# Patient Record
Sex: FEMALE | Race: WHITE | NOT HISPANIC OR LATINO | Employment: FULL TIME | URBAN - NONMETROPOLITAN AREA
[De-identification: names, ages, dates, MRNs, and addresses within clinical notes are randomized per-mention and may not be internally consistent; named-entity substitution may affect disease eponyms.]

---

## 2020-07-22 ENCOUNTER — HISTORICAL (OUTPATIENT)
Dept: ADMINISTRATIVE | Facility: HOSPITAL | Age: 35
End: 2020-07-22

## 2020-07-22 LAB
COVID-19 INTERNAL CONTROL: NORMAL
SARS-COV-2 RNA RESP QL NAA+PROBE: NOT DETECTED

## 2020-11-17 ENCOUNTER — CLINICAL SUPPORT (OUTPATIENT)
Dept: OTHER | Facility: CLINIC | Age: 35
End: 2020-11-17

## 2020-11-17 DIAGNOSIS — Z00.8 ENCOUNTER FOR OTHER GENERAL EXAMINATION: ICD-10-CM

## 2020-11-18 VITALS — HEIGHT: 64 IN

## 2020-11-18 LAB
GLUCOSE SERPL-MCNC: 87 MG/DL (ref 60–140)
HDLC SERPL-MCNC: 82 MG/DL
POC CHOLESTEROL, LDL (DOCK): 83 MG/DL
POC CHOLESTEROL, TOTAL: 182 MG/DL
TRIGL SERPL-MCNC: 83 MG/DL

## 2020-12-04 DIAGNOSIS — Z01.84 ANTIBODY RESPONSE EXAMINATION: ICD-10-CM

## 2021-01-13 ENCOUNTER — IMMUNIZATION (OUTPATIENT)
Dept: OBSTETRICS AND GYNECOLOGY | Facility: CLINIC | Age: 36
End: 2021-01-13

## 2021-01-13 DIAGNOSIS — Z23 NEED FOR VACCINATION: ICD-10-CM

## 2021-01-13 PROCEDURE — 0001A COVID-19, MRNA, LNP-S, PF, 30 MCG/0.3 ML DOSE VACCINE: ICD-10-PCS | Mod: CV19,,, | Performed by: ANESTHESIOLOGY

## 2021-01-13 PROCEDURE — 91300 COVID-19, MRNA, LNP-S, PF, 30 MCG/0.3 ML DOSE VACCINE: ICD-10-PCS | Mod: ,,, | Performed by: ANESTHESIOLOGY

## 2021-01-13 PROCEDURE — 0001A COVID-19, MRNA, LNP-S, PF, 30 MCG/0.3 ML DOSE VACCINE: CPT | Mod: CV19,,, | Performed by: ANESTHESIOLOGY

## 2021-01-13 PROCEDURE — 91300 COVID-19, MRNA, LNP-S, PF, 30 MCG/0.3 ML DOSE VACCINE: CPT | Mod: ,,, | Performed by: ANESTHESIOLOGY

## 2021-02-03 ENCOUNTER — IMMUNIZATION (OUTPATIENT)
Dept: OBSTETRICS AND GYNECOLOGY | Facility: CLINIC | Age: 36
End: 2021-02-03

## 2021-02-03 DIAGNOSIS — Z23 NEED FOR VACCINATION: Primary | ICD-10-CM

## 2021-02-03 PROCEDURE — 91300 COVID-19, MRNA, LNP-S, PF, 30 MCG/0.3 ML DOSE VACCINE: CPT | Mod: ,,, | Performed by: ANESTHESIOLOGY

## 2021-02-03 PROCEDURE — 0002A COVID-19, MRNA, LNP-S, PF, 30 MCG/0.3 ML DOSE VACCINE: CPT | Mod: CV19,,, | Performed by: ANESTHESIOLOGY

## 2021-02-03 PROCEDURE — 0002A COVID-19, MRNA, LNP-S, PF, 30 MCG/0.3 ML DOSE VACCINE: ICD-10-PCS | Mod: CV19,,, | Performed by: ANESTHESIOLOGY

## 2021-02-03 PROCEDURE — 91300 COVID-19, MRNA, LNP-S, PF, 30 MCG/0.3 ML DOSE VACCINE: ICD-10-PCS | Mod: ,,, | Performed by: ANESTHESIOLOGY

## 2021-05-04 PROBLEM — K11.20 SALIVARY GLAND INFECTION: Status: ACTIVE | Noted: 2021-05-04

## 2021-06-01 ENCOUNTER — CLINICAL SUPPORT (OUTPATIENT)
Dept: OTHER | Facility: CLINIC | Age: 36
End: 2021-06-01

## 2021-06-01 DIAGNOSIS — Z00.8 ENCOUNTER FOR OTHER GENERAL EXAMINATION: ICD-10-CM

## 2021-07-06 VITALS — HEIGHT: 64 IN | BODY MASS INDEX: 23.34 KG/M2

## 2021-07-06 LAB
GLUCOSE SERPL-MCNC: 77 MG/DL (ref 60–140)
HDLC SERPL-MCNC: 75 MG/DL
POC CHOLESTEROL, LDL (DOCK): 64 MG/DL
POC CHOLESTEROL, TOTAL: 148 MG/DL
TRIGL SERPL-MCNC: 47 MG/DL

## 2022-01-10 ENCOUNTER — PATIENT MESSAGE (OUTPATIENT)
Dept: ADMINISTRATIVE | Facility: OTHER | Age: 37
End: 2022-01-10

## 2022-01-10 ENCOUNTER — APPOINTMENT (OUTPATIENT)
Dept: PRIMARY CARE CLINIC | Facility: CLINIC | Age: 37
End: 2022-01-10

## 2022-01-10 DIAGNOSIS — Z11.52 ENCOUNTER FOR SCREENING LABORATORY TESTING FOR COVID-19 VIRUS: Primary | ICD-10-CM

## 2022-01-10 LAB
CTP QC/QA: YES
SARS-COV-2 AG RESP QL IA.RAPID: NEGATIVE

## 2022-01-10 PROCEDURE — 87811 SARS-COV-2 COVID19 W/OPTIC: CPT | Mod: PBBFAC,PN

## 2022-05-03 PROBLEM — Z00.00 WELLNESS EXAMINATION: Status: ACTIVE | Noted: 2022-05-03

## 2022-05-03 PROBLEM — R19.8 ALTERNATING CONSTIPATION AND DIARRHEA: Status: ACTIVE | Noted: 2022-05-03

## 2022-05-03 PROBLEM — R10.9 ABDOMINAL PAIN: Status: ACTIVE | Noted: 2022-05-03

## 2022-05-03 PROBLEM — Z80.0 FAMILY HISTORY OF COLON CANCER: Status: ACTIVE | Noted: 2022-05-03

## 2022-06-30 PROBLEM — D64.9 ANEMIA: Status: ACTIVE | Noted: 2022-06-30

## 2022-08-08 PROBLEM — Z00.00 WELLNESS EXAMINATION: Status: RESOLVED | Noted: 2022-05-03 | Resolved: 2022-08-08

## 2022-09-15 ENCOUNTER — IMMUNIZATION (OUTPATIENT)
Dept: PRIMARY CARE CLINIC | Facility: CLINIC | Age: 37
End: 2022-09-15

## 2022-09-15 DIAGNOSIS — Z23 NEED FOR VACCINATION: Primary | ICD-10-CM

## 2022-09-15 PROCEDURE — 0124A COVID-19, MRNA, LNP-S, BIVALENT BOOSTER, PF, 30 MCG/0.3 ML DOSE: CPT | Mod: PBBFAC,PN

## 2022-09-15 PROCEDURE — 91312 COVID-19, MRNA, LNP-S, BIVALENT BOOSTER, PF, 30 MCG/0.3 ML DOSE: CPT | Mod: PBBFAC,PN

## 2023-01-19 ENCOUNTER — CLINICAL SUPPORT (OUTPATIENT)
Dept: INTERNAL MEDICINE | Facility: CLINIC | Age: 38
End: 2023-01-19
Payer: COMMERCIAL

## 2023-01-19 DIAGNOSIS — Z00.00 ROUTINE GENERAL MEDICAL EXAMINATION AT A HEALTH CARE FACILITY: ICD-10-CM

## 2023-01-19 PROCEDURE — 97802 PR MED NUTR THER, 1ST, INDIV, EA 15 MIN: ICD-10-PCS | Mod: 95,,,

## 2023-01-19 PROCEDURE — 97802 MEDICAL NUTRITION INDIV IN: CPT | Mod: 95,,,

## 2023-01-19 NOTE — PROGRESS NOTES
The patient location is: [place], [place]  The chief complaint leading to consultation is: nutrition counseling    Visit type: audiovisual    Face to Face time with patient: 60  100 minutes of total time spent on the encounter, which includes face to face time and non-face to face time preparing to see the patient (eg, review of tests), Obtaining and/or reviewing separately obtained history, Documenting clinical information in the electronic or other health record, Independently interpreting results (not separately reported) and communicating results to the patient/family/caregiver, or Care coordination (not separately reported).         Each patient to whom he or she provides medical services by telemedicine is:  (1) informed of the relationship between the physician and patient and the respective role of any other health care provider with respect to management of the patient; and (2) notified that he or she may decline to receive medical services by telemedicine and may withdraw from such care at any time.    Notes:

## 2023-01-19 NOTE — PROGRESS NOTES
"Nutrition Assessment  Session Time:  60 minutes      Client name:  Billie Merlos  :  1985  Age:  37 y.o.  Gender:  female    Client states:  A very pleasant Ms. Vogel is here for a nutrition counseling session. 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 She is  with 5 children 16 and under who are involved in multiple activities. 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 Her  is an avid mary and they often have venison and fish. She is concerned that during the COVID pandemic, a very stressful time at work when she worked long hours from home and had to refrain from the many social and physical activities in her life, she gained 25 pounds, much of it due to stress eating and inactivity. She would like to "get back on track" but is finding it difficult to break the bad habits she started during COVID, particularly constant snacking of calorie dense foods. She has been skipping breakfast in an effort to cut down on calories, but finds herself making multiple trips to her pantry to snack later in the afternoons. We discussed having a bigger, balanced breakfast to avoid cravings later in the day as well as avoiding the kitchen when she comes home from work (her  cooks dinners) and focusing on only purchasing healthier snack options that are packaged or portioned for 1 portion only, such as snack packs of almonds and blueberries for after work and small packaged squares of dark chocolate for dessert after dinner. Ms. Vogel was very active prior to the pandemic. She belongs to a gym and enjoys the Body Pump and Body Combat classes there, and she would like to start attending them again on a regular basis. We also discussed " "integrating some 20+ minute walks 2-3 days per week to increase her activity. Educated patient on discerning eating from stress vs eating from hunger and finding alternate activities after work to alleviate stress (eg walking the dog, calling a friend).    Anthropometrics  Height:  64"     Weight:  150 lbs  BMI:  25.7  % Body Fat:  N/A    Clinical Signs/Symptoms  N/V/D:  alternating constipation and diarrhea (manageable, per pt) due to IBS  Appetite:  good       No past medical history on file.    Past Surgical History:   Procedure Laterality Date    WISDOM TOOTH EXTRACTION         Medications    has a current medication list which includes the following prescription(s): valacyclovir.    Vitamins, Minerals, and/or Supplements:  fish oil, vitamin C & E intermittently     Food/Medication Interactions:  Reviewed     Food Allergies or Intolerances:  none     Social History    Marital status:    Employment:  [employer] pharmacist    Social History     Tobacco Use    Smoking status: Never    Smokeless tobacco: Never   Substance Use Topics    Alcohol use: Yes     Comment: socially         Lab Reports   Sodium   Date Value Ref Range Status   06/15/2022 140 134 - 144 mmol/L Final     Potassium   Date Value Ref Range Status   06/15/2022 3.9 3.5 - 5.2 mmol/L Final     Chloride   Date Value Ref Range Status   06/15/2022 105 96 - 106 mmol/L Final     CO2   Date Value Ref Range Status   06/15/2022 24 20 - 29 mmol/L Final     Glucose   Date Value Ref Range Status   06/15/2022 90 65 - 99 mg/dL Final     BUN   Date Value Ref Range Status   06/15/2022 11 6 - 20 mg/dL Final     Creatinine   Date Value Ref Range Status   06/15/2022 0.73 0.57 - 1.00 mg/dL Final     Calcium   Date Value Ref Range Status   06/15/2022 8.5 (L) 8.7 - 10.2 mg/dL Final     Albumin   Date Value Ref Range Status   06/15/2022 4.2 3.8 - 4.8 g/dL Final     Total Bilirubin   Date Value Ref Range Status   06/15/2022 0.2 0.0 - 1.2 mg/dL Final     AST   Date Value " Ref Range Status   06/15/2022 15 0 - 40 IU/L Final     ALT   Date Value Ref Range Status   06/15/2022 7 0 - 32 IU/L Final     eGFR if non    Date Value Ref Range Status   06/04/2021 104 >59 mL/min/1.73 Final      Lab Results   Component Value Date    WBC 3.8 06/15/2022    HGB 10.8 (L) 06/15/2022    HCT 32.5 (L) 06/15/2022    MCV 94 06/15/2022     (L) 06/15/2022        Lab Results   Component Value Date    CHOL 122 06/15/2022     Lab Results   Component Value Date    HDL 49 06/15/2022     Lab Results   Component Value Date    LDLCALC 53 06/15/2022     Lab Results   Component Value Date    TRIG 109 06/15/2022     No results found for: CHOLHDL  Lab Results   Component Value Date    HGBA1C 5.2 06/15/2022     BP Readings from Last 1 Encounters:   06/30/22 100/70       Food History  Breakfast:  1 cup coffee with milk, honey and cinnamon; usually skips breakfast but had frittata of egg whites (2) and spinach today and liked it  Mid-morning Snack:  none  Lunch:  usually soup (tomato basil, vegetable, broccoli & cheddar) and whatever protein is offered at the cafeteria that day with a side of vegetables  Mid-afternoon Snack:  multiple trips to pantry for: chocolate, ritz crackers with jalapeno artichoke dip, frozen blueberries, popcorn  Dinner:  cut up potatoes baked with olive oil and Walter's seasoning and another vegetable with some type protein (venison & pork, chicken, fish), sometimes a salad  Snack:  a few squares of chocolate  *Fluid intake:  water, coffee  Alcohol: none during the week; on weekends: 2 drinks on Friday and Saturday nights    Exercise History:  no formal exercise currently; was attending classes at her gym 2-3x per week last fall but stopped for Christmas holiday and would like to restart    Cultural/Spiritual/Personal Preferences:  None identified    Support System:  spouse and children    State of Change:  Preparation    Barriers to Change:  habit energy; kids have very busy  schedule    Diagnosis    Involuntary weight gain related to poor food choices and inactivity as evidenced by lack of exercise/activity during pandemic and high calorie/processed food choices reported by patient.    Intervention    RMR (Method:  Jessica Ortega Erik):  1354 kcal  Activity Factor:  1.3    BHAVIK:  1760 kcal    Goals:  1.  Eat a well-balanced breakfast of 300-450 kcal daily (options discussed).  2.  Focus on purchasing or preparing healthy snack options that are already single portioned (options discussed).  3.  Learn to recognize stress eating and aim to alleviate stress through alternate enjoyable activities.  4. Make it a rule to avoid the kitchen/pantry when arriving home from work until it is no longer a trigger for eating mindlessly.  5. Walk the dog with the kids at least 2 days per week for 20+ minutes.  6. Attend cardio classes at the gym 2-3 days per week.    Nutrition Education  The following education was provided to the patient:  Complimented patient on proactive role in health maintenance.  Complimented patient on physical activity efforts.  Discussed meal planning/[employer] My Plate design.  Discussed healthy snacking.   Discussed weight management.  Discussed Heart Healthy Eating.  Discussed Iron-Rich Nutrition Therapy.  Suggested dietary modifications based on current dietary behaviors and individual food preferences.  Discussed macronutrient distribution among meals and snacks, including CHO, protein, and fat recommendations and its importance/benefits.  Discussed nutrition-related lab values and dietary and/or lifestyle factors affecting them.  Discussed [facility] client resources (may include but not limited to [facility] Eat Fit Shopping List, Fueling Well on the Go, Lite Restaurant Guide, and Meal Planning Guide).  Discussed goal setting.  Provided ongoing support, encouragement, and guidance toward improved health efforts.    Patient verbalized understanding of nutrition education and recommendations  received.    Handouts Provided  Meal Planning Guide  Restaurant Guide  Eat Fit Shopping List  Eat Fit Melody  Fueling Well On-The-Go    Monitoring/Evaluation    Monitor the following:  Weight  BMI  % Body Fat  Caloric intake  Labs:  Hgb/Hct    Follow Up Plan:  Communication with referring healthcare provider is unnecessary at this time as patient presented as part of annual wellness exam.  However, will follow up with patient in 1-2 years.

## 2023-10-13 RX ORDER — CIPROFLOXACIN 500 MG/1
500 TABLET ORAL EVERY 12 HOURS
Qty: 14 TABLET | Refills: 0 | Status: SHIPPED | OUTPATIENT
Start: 2023-10-13 | End: 2024-03-12 | Stop reason: ALTCHOICE

## 2024-03-12 ENCOUNTER — LAB VISIT (OUTPATIENT)
Dept: LAB | Facility: HOSPITAL | Age: 39
End: 2024-03-12
Attending: INTERNAL MEDICINE
Payer: COMMERCIAL

## 2024-03-12 DIAGNOSIS — Z00.00 WELLNESS EXAMINATION: ICD-10-CM

## 2024-03-12 DIAGNOSIS — Z13.1 SCREENING FOR DIABETES MELLITUS: ICD-10-CM

## 2024-03-12 DIAGNOSIS — Z79.899 ENCOUNTER FOR LONG-TERM (CURRENT) USE OF OTHER MEDICATIONS: ICD-10-CM

## 2024-03-12 DIAGNOSIS — Z13.220 SCREENING FOR LIPOID DISORDERS: ICD-10-CM

## 2024-03-12 DIAGNOSIS — D64.9 ANEMIA, UNSPECIFIED TYPE: ICD-10-CM

## 2024-03-12 DIAGNOSIS — Z00.00 ROUTINE GENERAL MEDICAL EXAMINATION AT A HEALTH CARE FACILITY: ICD-10-CM

## 2024-03-12 DIAGNOSIS — Z13.29 SCREENING FOR THYROID DISORDER: ICD-10-CM

## 2024-03-12 DIAGNOSIS — Z13.6 SCREENING FOR CARDIOVASCULAR CONDITION: ICD-10-CM

## 2024-03-12 DIAGNOSIS — K90.41 GLUTEN INTOLERANCE: ICD-10-CM

## 2024-03-12 PROBLEM — K11.20 SALIVARY GLAND INFECTION: Status: RESOLVED | Noted: 2021-05-04 | Resolved: 2024-03-12

## 2024-03-12 LAB
ALBUMIN SERPL BCP-MCNC: 4.5 G/DL (ref 3.5–5.2)
ALBUMIN/CREAT UR: NORMAL UG/MG (ref 0–30)
ALP SERPL-CCNC: 59 U/L (ref 55–135)
ALT SERPL W/O P-5'-P-CCNC: 16 U/L (ref 10–44)
ANION GAP SERPL CALC-SCNC: 10 MMOL/L (ref 3–11)
AST SERPL-CCNC: 13 U/L (ref 10–40)
BASOPHILS # BLD AUTO: 0.03 K/UL (ref 0–0.2)
BASOPHILS NFR BLD: 0.6 % (ref 0–1.9)
BILIRUB SERPL-MCNC: 0.8 MG/DL (ref 0.1–1)
BUN SERPL-MCNC: 8 MG/DL (ref 6–20)
CALCIUM SERPL-MCNC: 9.4 MG/DL (ref 8.7–10.5)
CHLORIDE SERPL-SCNC: 104 MMOL/L (ref 95–110)
CHOLEST SERPL-MCNC: 163 MG/DL (ref 120–199)
CHOLEST/HDLC SERPL: 2.7 {RATIO} (ref 2–5)
CO2 SERPL-SCNC: 23 MMOL/L (ref 23–29)
CREAT SERPL-MCNC: 0.7 MG/DL (ref 0.5–1.4)
CREAT UR-MCNC: 25.7 MG/DL (ref 15–325)
DIFFERENTIAL METHOD BLD: ABNORMAL
EOSINOPHIL # BLD AUTO: 0 K/UL (ref 0–0.5)
EOSINOPHIL NFR BLD: 0 % (ref 0–8)
ERYTHROCYTE [DISTWIDTH] IN BLOOD BY AUTOMATED COUNT: 12.4 % (ref 11.5–14.5)
EST. GFR  (NO RACE VARIABLE): >60 ML/MIN/1.73 M^2
ESTIMATED AVG GLUCOSE: 94 MG/DL (ref 68–131)
FERRITIN SERPL-MCNC: 52 NG/ML (ref 20–300)
FOLATE SERPL-MCNC: 36.9 NG/ML (ref 4–24)
GLUCOSE SERPL-MCNC: 82 MG/DL (ref 70–110)
HBA1C MFR BLD: 4.9 % (ref 4–5.6)
HCT VFR BLD AUTO: 36 % (ref 37–48.5)
HDLC SERPL-MCNC: 61 MG/DL (ref 40–75)
HDLC SERPL: 37.4 % (ref 20–50)
HGB BLD-MCNC: 12.5 G/DL (ref 12–16)
IMM GRANULOCYTES # BLD AUTO: 0.01 K/UL (ref 0–0.04)
IMM GRANULOCYTES NFR BLD AUTO: 0.2 % (ref 0–0.5)
IRON SATN MFR SERPL: 25 % (ref 20–50)
IRON SERPL-MCNC: 92 UG/DL (ref 30–160)
LDLC SERPL CALC-MCNC: 72.6 MG/DL (ref 63–159)
LYMPHOCYTES # BLD AUTO: 1.3 K/UL (ref 1–4.8)
LYMPHOCYTES NFR BLD: 26.7 % (ref 18–48)
MCH RBC QN AUTO: 31.4 PG (ref 27–31)
MCHC RBC AUTO-ENTMCNC: 34.7 G/DL (ref 32–36)
MCV RBC AUTO: 91 FL (ref 82–98)
MICROALBUMIN UR DL<=1MG/L-MCNC: <5 MG/L
MONOCYTES # BLD AUTO: 0.2 K/UL (ref 0.3–1)
MONOCYTES NFR BLD: 4.5 % (ref 4–15)
NEUTROPHILS # BLD AUTO: 3.3 K/UL (ref 1.8–7.7)
NEUTROPHILS NFR BLD: 68 % (ref 38–73)
NONHDLC SERPL-MCNC: 102 MG/DL
NRBC BLD-RTO: 0 /100 WBC
PLATELET # BLD AUTO: 160 K/UL (ref 150–450)
PMV BLD AUTO: 11.5 FL (ref 9.2–12.9)
POTASSIUM SERPL-SCNC: 3.5 MMOL/L (ref 3.5–5.1)
PROT SERPL-MCNC: 8.5 G/DL (ref 6–8.4)
RBC # BLD AUTO: 3.98 M/UL (ref 4–5.4)
RETICS/RBC NFR AUTO: 1.4 % (ref 0.5–2.5)
SODIUM SERPL-SCNC: 137 MMOL/L (ref 136–145)
T4 FREE SERPL-MCNC: 1.03 NG/DL (ref 0.71–1.51)
THYROPEROXIDASE IGG SERPL-ACNC: <6 IU/ML
TOTAL IRON BINDING CAPACITY: 371 UG/DL (ref 250–450)
TRIGL SERPL-MCNC: 147 MG/DL (ref 30–150)
TSH SERPL DL<=0.005 MIU/L-ACNC: 1.69 UIU/ML (ref 0.4–4)
VIT B12 SERPL-MCNC: 471 PG/ML (ref 210–950)
WBC # BLD AUTO: 4.91 K/UL (ref 3.9–12.7)

## 2024-03-12 PROCEDURE — 83540 ASSAY OF IRON: CPT | Performed by: INTERNAL MEDICINE

## 2024-03-12 PROCEDURE — 86039 ANTINUCLEAR ANTIBODIES (ANA): CPT | Performed by: INTERNAL MEDICINE

## 2024-03-12 PROCEDURE — 84443 ASSAY THYROID STIM HORMONE: CPT | Performed by: INTERNAL MEDICINE

## 2024-03-12 PROCEDURE — 85045 AUTOMATED RETICULOCYTE COUNT: CPT | Performed by: INTERNAL MEDICINE

## 2024-03-12 PROCEDURE — 84481 FREE ASSAY (FT-3): CPT | Performed by: INTERNAL MEDICINE

## 2024-03-12 PROCEDURE — 82043 UR ALBUMIN QUANTITATIVE: CPT | Performed by: INTERNAL MEDICINE

## 2024-03-12 PROCEDURE — 83036 HEMOGLOBIN GLYCOSYLATED A1C: CPT | Performed by: INTERNAL MEDICINE

## 2024-03-12 PROCEDURE — 80061 LIPID PANEL: CPT | Performed by: INTERNAL MEDICINE

## 2024-03-12 PROCEDURE — 36415 COLL VENOUS BLD VENIPUNCTURE: CPT | Performed by: INTERNAL MEDICINE

## 2024-03-12 PROCEDURE — 86235 NUCLEAR ANTIGEN ANTIBODY: CPT | Mod: 59 | Performed by: INTERNAL MEDICINE

## 2024-03-12 PROCEDURE — 86038 ANTINUCLEAR ANTIBODIES: CPT | Performed by: INTERNAL MEDICINE

## 2024-03-12 PROCEDURE — 82728 ASSAY OF FERRITIN: CPT | Performed by: INTERNAL MEDICINE

## 2024-03-12 PROCEDURE — 82746 ASSAY OF FOLIC ACID SERUM: CPT | Performed by: INTERNAL MEDICINE

## 2024-03-12 PROCEDURE — 80053 COMPREHEN METABOLIC PANEL: CPT | Performed by: INTERNAL MEDICINE

## 2024-03-12 PROCEDURE — 86376 MICROSOMAL ANTIBODY EACH: CPT | Performed by: INTERNAL MEDICINE

## 2024-03-12 PROCEDURE — 82607 VITAMIN B-12: CPT | Performed by: INTERNAL MEDICINE

## 2024-03-12 PROCEDURE — 85025 COMPLETE CBC W/AUTO DIFF WBC: CPT | Performed by: INTERNAL MEDICINE

## 2024-03-12 PROCEDURE — 84439 ASSAY OF FREE THYROXINE: CPT | Performed by: INTERNAL MEDICINE

## 2024-03-13 LAB
ANA PATTERN 1: NORMAL
ANA SER QL IF: POSITIVE
ANA TITR SER IF: NORMAL {TITER}
T3FREE SERPL-MCNC: 2.7 PG/ML (ref 2.3–4.2)

## 2024-03-14 LAB
ANTI SM ANTIBODY: 0.13 RATIO (ref 0–0.99)
ANTI SM/RNP ANTIBODY: 0.12 RATIO (ref 0–0.99)
ANTI-SM INTERPRETATION: NEGATIVE
ANTI-SM/RNP INTERPRETATION: NEGATIVE
ANTI-SSA ANTIBODY: 5.35 RATIO (ref 0–0.99)
ANTI-SSA INTERPRETATION: POSITIVE
ANTI-SSB ANTIBODY: 0.15 RATIO (ref 0–0.99)
ANTI-SSB INTERPRETATION: NEGATIVE
DSDNA AB SER-ACNC: ABNORMAL [IU]/ML

## 2024-04-12 ENCOUNTER — LAB VISIT (OUTPATIENT)
Dept: LAB | Facility: HOSPITAL | Age: 39
End: 2024-04-12
Attending: FAMILY MEDICINE
Payer: COMMERCIAL

## 2024-04-12 DIAGNOSIS — Z13.29 SCREENING FOR THYROID DISORDER: ICD-10-CM

## 2024-04-12 DIAGNOSIS — K90.41 GLUTEN INTOLERANCE: ICD-10-CM

## 2024-04-12 DIAGNOSIS — D64.9 ANEMIA, UNSPECIFIED TYPE: ICD-10-CM

## 2024-04-12 DIAGNOSIS — Z79.899 ENCOUNTER FOR LONG-TERM (CURRENT) USE OF OTHER MEDICATIONS: ICD-10-CM

## 2024-04-12 LAB
T3FREE SERPL-MCNC: 2.7 PG/ML (ref 2.3–4.2)
T4 FREE SERPL-MCNC: 1 NG/DL (ref 0.71–1.51)
TSH SERPL DL<=0.005 MIU/L-ACNC: 1.91 UIU/ML (ref 0.4–4)

## 2024-04-12 PROCEDURE — 84439 ASSAY OF FREE THYROXINE: CPT | Performed by: FAMILY MEDICINE

## 2024-04-12 PROCEDURE — 36415 COLL VENOUS BLD VENIPUNCTURE: CPT | Performed by: FAMILY MEDICINE

## 2024-04-12 PROCEDURE — 84481 FREE ASSAY (FT-3): CPT | Performed by: FAMILY MEDICINE

## 2024-04-12 PROCEDURE — 84443 ASSAY THYROID STIM HORMONE: CPT | Performed by: FAMILY MEDICINE

## 2024-04-18 PROBLEM — M35.00 SJOGREN'S SYNDROME: Status: ACTIVE | Noted: 2024-04-18

## 2024-04-18 PROBLEM — R76.8 POSITIVE ANA (ANTINUCLEAR ANTIBODY): Status: ACTIVE | Noted: 2024-04-18

## 2024-06-17 PROBLEM — Z00.00 ROUTINE GENERAL MEDICAL EXAMINATION AT A HEALTH CARE FACILITY: Status: RESOLVED | Noted: 2022-05-03 | Resolved: 2024-06-17

## 2024-12-04 ENCOUNTER — PATIENT MESSAGE (OUTPATIENT)
Dept: ADMINISTRATIVE | Facility: OTHER | Age: 39
End: 2024-12-04
Payer: COMMERCIAL